# Patient Record
Sex: FEMALE | Race: WHITE
[De-identification: names, ages, dates, MRNs, and addresses within clinical notes are randomized per-mention and may not be internally consistent; named-entity substitution may affect disease eponyms.]

---

## 2019-08-06 ENCOUNTER — HOSPITAL ENCOUNTER (OUTPATIENT)
Dept: HOSPITAL 62 - RAD | Age: 29
End: 2019-08-06
Attending: GENERAL PRACTICE
Payer: COMMERCIAL

## 2019-08-06 DIAGNOSIS — N97.9: Primary | ICD-10-CM

## 2019-08-06 PROCEDURE — 58340 CATHETER FOR HYSTEROGRAPHY: CPT

## 2019-08-06 PROCEDURE — 74740 X-RAY FEMALE GENITAL TRACT: CPT

## 2019-08-06 NOTE — RADIOLOGY REPORT (SQ)
EXAM DESCRIPTION:  HYSTEROSALPINGOGRAM; HYSTERO CATH/INJECTION



COMPLETED DATE/TIME:  8/6/2019 4:00 pm; 8/6/2019 4:01 pm



REASON FOR STUDY:  INFERTILITY



COMPARISON:  None.



PROCEDURE:  PRE-PROCEDURE: Procedure was explained to the patient. She was told to expect cramping du
ring the procedure, and possible spotting post procedure.

PROCEDURE: The cervix was prepped in sterile fashion.  Under direct visual inspection, the cervix was
 cannulated with the hysterosalpingogram catheter and contrast injected.



TECHNIQUE:  Temporal fluoroscopic images acquired during the procedure stored to PACS.



FLUOROSCOPY TIME:  Less than 5 seconds

8 digital radiographic images saved to PACS.



LIMITATIONS:  None.



FINDINGS:  UTERUS: No identified anomalies.  No synechia.

RIGHT ADNEXA: Normal size fallopian tube.  Free spill of contrast into the peritoneal cavity.

LEFT ADNEXA: Normal size fallopian tube.  Free spill of contrast into the peritoneal cavity.

POST PROCEDURE: The patient tolerated the procedure with no adverse effects.



IMPRESSION:  NORMAL HYSTEROSALPINGOGRAM.



COMMENT:  Study performed by and interpreted by the radiologist.

Study performed by OB/GYN physician.  Supervision and interpretation by the radiologist.

Quality :  Final reports for procedures using fluoroscopy that document radiation exposure alonso
chao, or exposure time and number of fluorographic images (if radiation exposure indices are not avail
able)



TECHNICAL DOCUMENTATION:  JOB ID:  [id number]

 2011 [employer]- All Rights Reserved



Reading location - IP/workstation name: [email or web address]

## 2019-08-06 NOTE — RADIOLOGY REPORT (SQ)
EXAM DESCRIPTION:  HYSTEROSALPINGOGRAM; HYSTERO CATH/INJECTION



COMPLETED DATE/TIME:  8/6/2019 4:00 pm; 8/6/2019 4:01 pm



REASON FOR STUDY:  INFERTILITY



COMPARISON:  None.



PROCEDURE:  PRE-PROCEDURE: Procedure was explained to the patient. She was told to expect cramping du
ring the procedure, and possible spotting post procedure.

PROCEDURE: The cervix was prepped in sterile fashion.  Under direct visual inspection, the cervix was
 cannulated with the hysterosalpingogram catheter and contrast injected.



TECHNIQUE:  Temporal fluoroscopic images acquired during the procedure stored to PACS.



FLUOROSCOPY TIME:  Less than 5 seconds

8 digital radiographic images saved to PACS.



LIMITATIONS:  None.



FINDINGS:  UTERUS: No identified anomalies.  No synechia.

RIGHT ADNEXA: Normal size fallopian tube.  Free spill of contrast into the peritoneal cavity.

LEFT ADNEXA: Normal size fallopian tube.  Free spill of contrast into the peritoneal cavity.

POST PROCEDURE: The patient tolerated the procedure with no adverse effects.



IMPRESSION:  NORMAL HYSTEROSALPINGOGRAM.



COMMENT:  Study performed by and interpreted by the radiologist.

Study performed by OB/GYN physician.  Supervision and interpretation by the radiologist.

Quality :  Final reports for procedures using fluoroscopy that document radiation exposure aolnso
chao, or exposure time and number of fluorographic images (if radiation exposure indices are not avail
able)



TECHNICAL DOCUMENTATION:  JOB ID:  [id number]

 2011 [employer]- All Rights Reserved



Reading location - IP/workstation name: [email or web address]